# Patient Record
Sex: FEMALE
[De-identification: names, ages, dates, MRNs, and addresses within clinical notes are randomized per-mention and may not be internally consistent; named-entity substitution may affect disease eponyms.]

---

## 2020-10-21 ENCOUNTER — HOSPITAL ENCOUNTER (EMERGENCY)
Dept: HOSPITAL 97 - ER | Age: 25
LOS: 1 days | Discharge: HOME | End: 2020-10-22
Payer: SELF-PAY

## 2020-10-21 DIAGNOSIS — Z88.6: ICD-10-CM

## 2020-10-21 DIAGNOSIS — E03.9: ICD-10-CM

## 2020-10-21 DIAGNOSIS — S62.632A: Primary | ICD-10-CM

## 2020-10-21 PROCEDURE — 99284 EMERGENCY DEPT VISIT MOD MDM: CPT

## 2020-10-22 VITALS — SYSTOLIC BLOOD PRESSURE: 110 MMHG | OXYGEN SATURATION: 100 % | DIASTOLIC BLOOD PRESSURE: 84 MMHG | TEMPERATURE: 99 F

## 2020-10-22 PROCEDURE — 2W3JX1Z IMMOBILIZATION OF RIGHT FINGER USING SPLINT: ICD-10-PCS

## 2020-10-22 NOTE — EDPHYS
Physician Documentation                                                                           

 [facility]                                                                 

Name: Amira Sprague                                                                    

Age: 24 yrs                                                                                       

Sex: Female                                                                                       

: 1995                                                                                   

MRN: [id number]                                                                                   

Arrival Date: 10/21/2020                                                                          

Time: 22:41                                                                                       

Account#: [id number]                                                                            

Bed 15                                                                                            

Private MD:                                                                                       

ED Physician Shubham Alvarez                                                                         

HPI:                                                                                              

10/21                                                                                             

23:59 This 24 yrs old  Female presents to ER via Ambulatory with complaints of Finger jmm 

      Injury.                                                                                     

23:59 The patient or guardian reports injury, pain. Onset: The symptoms/episode               jmm 

      began/occurred acutely, just prior to arrival. Modifying factors: The symptoms are          

      alleviated by nothing, the symptoms are aggravated by nothing. Associated signs and         

      symptoms: Pertinent negatives: fever, numbness distally, tingling distally. This is a       

      24 year old female with a history of PE, hypothyroidism that presents to the ED with        

      complaints of right 3rd finger pain. Injury occurred while changing clothes. Denies         

      other injury. .                                                                             

                                                                                                  

OB/GYN:                                                                                           

23:15 LMP N/A -                                                                               bb  

                                                                                                  

Historical:                                                                                       

- Allergies:                                                                                      

23:15 Motrin;                                                                                 bb  

- Home Meds:                                                                                      

23:15 levothyroxine 50 mcg tab 1 tab once daily [Active]; clopidogrel 75 mg oral tab 1 tab    bb  

      once daily [Active];                                                                        

- PMHx:                                                                                           

23:15 PE; Hypothyroidism;                                                                     bb  

                                                                                                  

- Immunization history:: Adult Immunizations unknown.                                             

- Social history:: Smoking status: Patient denies any tobacco usage or history of.                

                                                                                                  

                                                                                                  

ROS:                                                                                              

23:59 Constitutional: Negative for fever, chills, and weight loss, Cardiovascular: Negative   jmm 

      for chest pain, palpitations, and edema, Respiratory: Negative for shortness of breath,     

      cough, wheezing, and pleuritic chest pain.                                                  

23:59 MS/extremity: Positive for injury or acute deformity.                                       

23:59 All other systems are negative.                                                             

                                                                                                  

Exam:                                                                                             

23:59 Constitutional:  This is a well developed, well nourished patient who is awake, alert,  jmm 

      and in no acute distress. Head/Face:  atraumatic. Eyes:  EOMI, no conjunctival erythema     

      appreciated ENT:  Moist Mucus Membranes Neck:  Trachea midline, Supple Chest/axilla:        

      Normal chest wall appearance and motion.   Cardiovascular:  Regular rate and rhythm.        

      No edema appreciated Respiratory:  Normal respirations, no respiratory distress             

      appreciated Abdomen/GI:  Non distended, soft Back:  Normal ROM Skin:  General               

      appearance color normal                                                                     

23:59 Musculoskeletal/extremity: right 3rd dip ttp, < 2 sec dist cap refill, NVI.                 

23:59 Skin: Appearance: Color: normal in color.                                                   

23:59 Neuro: Orientation: is normal, Mentation: is normal, Memory: is normal.                     

23:59 Psych: Behavior/mood is pleasant, cooperative.                                              

                                                                                                  

Vital Signs:                                                                                      

23:00  / 84; Pulse 80; Resp 16 S; Temp 99(TE); Pulse Ox 100% on R/A; Weight 65.77 kg    bb  

      (R); Height 5 ft. 4 in. (162.56 cm) (R); Pain 710;                                         

23:00 Body Mass Index 24.89 (65.77 kg, 162.56 cm)                                             bb  

                                                                                                  

MDM:                                                                                              

22:55 Patient medically screened.                                                             [facility] 

10/22                                                                                             

00:02 Data reviewed: vital signs, nurses notes. Counseling: I had a detailed discussion with  elyssa 

      the patient and/or guardian regarding: the historical points, exam findings, and any        

      diagnostic results supporting the discharge/admit diagnosis, radiology results, the         

      need for outpatient follow up, to return to the emergency department if symptoms worsen     

      or persist or if there are any questions or concerns that arise at home.                    

                                                                                                  

10/21                                                                                             

22:56 Order name: Hand Right 3 View XRAY                                                      [facility] 

10/21                                                                                             

22:56 Order name: Finger Splint; Complete Time: 00:18                                         [facility] 

                                                                                                  

Administered Medications:                                                                         

10/21                                                                                             

23:03 Drug: Norco 5 mg-325 mg 1 tabs Route: PO;                                               jd3 

10/22                                                                                             

00:18 Follow up: Response: No adverse reaction                                                ll2 

                                                                                                  

                                                                                                  

Disposition:                                                                                      

03:21 Co-signature as Attending Physician, Shubham Alvarez MD.                                    rn  

                                                                                                  

Disposition:                                                                                      

10/22/20 00:03 Discharged to Home. Impression: Finger Fracture.                                   

- Condition is Stable.                                                                            

- Discharge Instructions: Finger Fracture.                                                        

- Prescriptions for Ultracet 37.5- 325 mg Oral Tablet - take 1 tablet by ORAL route               

  every 6 hours - for up to 5 days; do not exceed 8 tablets per day.; 20 tablet.                  

- Medication Reconciliation Form, Thank You Letter, Antibiotic Education, Prescription            

  Opioid Use form.                                                                                

- Follow up: Private Physician; When: 2 - 3 days; Reason: Recheck today's complaints,             

  Continuance of care, Re-evaluation by your physician.                                           

                                                                                                  

                                                                                                  

                                                                                                  

Signatures:                                                                                       

Dispatcher [employer]                           EDWill Naranjo PA PA jmm Ballard, Kathy, RN                     RN   Shubham De Santiago MD MD rn Davies, Jonathon, RN                    RN   jd3                                                  

Chely Fields RN                    RN   ll2                                                  

                                                                                                  

Corrections: (The following items were deleted from the chart)                                    

00:18 00:03 10/22/2020 00:03 Discharged to Home. Impression: Finger Fracture. Condition is    ll2 

      Stable. Forms are Medication Reconciliation Form, Thank You Letter, Antibiotic              

      Education, Prescription Opioid Use. Follow up: Private Physician; When: 2 - 3 days;         

      Reason: Recheck today's complaints, Continuance of care, Re-evaluation by your              

      physician. elyssa                                                                              

                                                                                                  

**************************************************************************************************

## 2020-10-22 NOTE — ER
Nurse's Notes                                                                                     

 [facility]                                                                 

Name: Amira Sprague                                                                    

Age: 24 yrs                                                                                       

Sex: Female                                                                                       

: 1995                                                                                   

MRN: [id number]                                                                                   

Arrival Date: 10/21/2020                                                                          

Time: 22:41                                                                                       

Account#: [id number]                                                                            

Bed 15                                                                                            

Private MD:                                                                                       

Diagnosis: Finger Fracture                                                                        

                                                                                                  

Presentation:                                                                                     

10/21                                                                                             

23:00 Chief complaint: Patient states: she accidently caught her right middle finger in her   bb  

      shirt and injured it now it is swollen and painful. Coronavirus screen: At this time,       

      the client does not indicate any symptoms associated with coronavirus-19. Ebola Screen:     

      No symptoms or risks identified at this time. Initial Sepsis Screen: Does the patient       

      meet any 2 criteria? No. Patient's initial sepsis screen is negative. Does the patient      

      have a suspected source of infection? No. Patient's initial sepsis screen is negative.      

      Risk Assessment: Do you want to hurt yourself or someone else? Patient reports no           

      desire to harm self or others. Onset of symptoms was 2020.                      

23:00 Method Of Arrival: Ambulatory                                                           bb  

23:00 Acuity: NICK 4                                                                           bb  

                                                                                                  

Triage Assessment:                                                                                

10/22                                                                                             

00:16 General: Appears in no apparent distress. Pain:.                                        ll2 

00:17 Injury Description: pt fell while middle finger was stuck inside shirt.                 ll2 

                                                                                                  

OB/GYN:                                                                                           

10/21                                                                                             

23:15 LMP N/A -                                                                               bb  

                                                                                                  

Historical:                                                                                       

- Allergies:                                                                                      

23:15 Motrin;                                                                                 bb  

- Home Meds:                                                                                      

23:15 levothyroxine 50 mcg tab 1 tab once daily [Active]; clopidogrel 75 mg oral tab 1 tab    bb  

      once daily [Active];                                                                        

- PMHx:                                                                                           

23:15 PE; Hypothyroidism;                                                                     bb  

                                                                                                  

- Immunization history:: Adult Immunizations unknown.                                             

- Social history:: Smoking status: Patient denies any tobacco usage or history of.                

                                                                                                  

                                                                                                  

Screening:                                                                                        

10/22                                                                                             

00:16 Abuse screen: Denies threats or abuse. Nutritional screening: No deficits noted.        ll2 

      Tuberculosis screening: No symptoms or risk factors identified. Fall Risk None              

      identified.                                                                                 

                                                                                                  

Assessment:                                                                                       

10/21                                                                                             

23:15 General: Appears in no apparent distress. uncomfortable, Behavior is calm, cooperative, jd3 

      appropriate for age. Pain: Complains of pain in palmar aspect of distal phalanx of          

      right middle finger Quality of pain is described as sharp, tender. Neuro: Level of          

      Consciousness is awake, alert, obeys commands, Oriented to person, place, time,             

      situation. Cardiovascular: Denies Capillary refill < 3 seconds Patient's skin is warm       

      and dry. Respiratory: Airway is patent Respiratory effort is even, unlabored,               

      Respiratory pattern is regular, symmetrical, Denies cough, shortness of breath. GI: No      

      signs and/or symptoms were reported involving the gastrointestinal system. : No signs     

      and/or symptoms were reported regarding the genitourinary system. EENT: No signs and/or     

      symptoms were reported regarding the EENT system. Derm: Skin is intact, Skin is dry,        

      Skin is normal, Skin temperature is warm Bruising that is dark purple, on palmar aspect     

      of distal phalanx of right middle finger. Musculoskeletal: Circulation, motion, and         

      sensation intact. Range of motion: intact in all extremities.                               

23:55 Reassessment: Patient appears in no apparent distress at this time. Patient and/or      jd3 

      family updated on plan of care and expected duration. Pain level reassessed. Patient is     

      alert, oriented x 3, equal unlabored respirations, skin warm/dry/pink. awaiting X-ray       

      results.                                                                                    

                                                                                                  

Vital Signs:                                                                                      

23:00  / 84; Pulse 80; Resp 16 S; Temp 99(TE); Pulse Ox 100% on R/A; Weight 65.77 kg    bb  

      (R); Height 5 ft. 4 in. (162.56 cm) (R); Pain 7/10;                                         

23:00 Body Mass Index 24.89 (65.77 kg, 162.56 cm)                                             bb  

                                                                                                  

ED Course:                                                                                        

22:41 Patient arrived in ED.                                                                  am2 

22:49 Will Barros PA is PHCP.                                                              [facility] 

22:49 Shubham Alvarez MD is Attending Physician.                                                [facility] 

22:54 David Lan RN is Primary Nurse.                                                  jd3 

23:13 Triage completed.                                                                       bb  

23:15 Arm band placed on Patient placed in an exam room, on a stretcher, on pulse oximetry.   bb  

      Family accompanied patient.                                                                 

10/22                                                                                             

00:10 Hand Right 3 View XRAY In Process Unspecified.                                          EDMS

00:16 Patient has correct armband on for positive identification. Call light in reach. Side   ll2 

      rails up X 1. Adult w/ patient. Pulse ox on. NIBP on.                                       

00:16 No provider procedures requiring assistance completed. Patient did not have IV access   ll2 

      during this emergency room visit.                                                           

                                                                                                  

Administered Medications:                                                                         

10/21                                                                                             

23:03 Drug: Norco 5 mg-325 mg 1 tabs Route: PO;                                               jd3 

10/22                                                                                             

00:18 Follow up: Response: No adverse reaction                                                ll2 

                                                                                                  

                                                                                                  

Outcome:                                                                                          

00:03 Discharge ordered by MD. bergeron 

00:17 Discharged to home ambulatory.                                                          ll2 

00:17 Condition: stable                                                                           

00:17 Discharge instructions given to patient, family, Instructed on discharge instructions,      

      follow up and referral plans. medication usage, Demonstrated understanding of               

      instructions, follow-up care, medications, Prescriptions given X 1.                         

00:18 Patient left the ED.                                                                    ll2 

                                                                                                  

Signatures:                                                                                       

Dispatcher [employer]                           EDMS                                                 

Will Barros PA PA jmm Ballard, Brenda, RN                     RN   Felicia Swenson Jonathon, RN                    RN   jChely Camargo RN                    RN   ll2                                                  

                                                                                                  

**************************************************************************************************

## 2020-10-22 NOTE — RAD REPORT
EXAM DESCRIPTION:  RAD - Hand Right 3 View - 10/22/2020 12:03 am

 

CLINICAL HISTORY:  Right hand pain status post injury

 

FINDINGS:  Small transverse lucency is present in the base third distal phalanx seen on frontal view.
 This is equivocal for a small nondisplaced fracture.

 

No dislocation

## 2023-04-26 ENCOUNTER — HOSPITAL ENCOUNTER (EMERGENCY)
Dept: HOSPITAL 97 - ER | Age: 28
Discharge: HOME | End: 2023-04-26
Payer: SELF-PAY

## 2023-04-26 VITALS — SYSTOLIC BLOOD PRESSURE: 122 MMHG | DIASTOLIC BLOOD PRESSURE: 84 MMHG | OXYGEN SATURATION: 99 %

## 2023-04-26 VITALS — TEMPERATURE: 98.5 F

## 2023-04-26 DIAGNOSIS — E03.9: ICD-10-CM

## 2023-04-26 DIAGNOSIS — Z88.5: ICD-10-CM

## 2023-04-26 DIAGNOSIS — K13.70: Primary | ICD-10-CM

## 2023-04-26 PROCEDURE — 99283 EMERGENCY DEPT VISIT LOW MDM: CPT

## 2023-04-26 NOTE — ER
Nurse's Notes                                                                                     

 [facility]                                                                 

Name: Amira Sprague                                                                    

Age: 27 yrs                                                                                       

Sex: Female                                                                                       

: 1995                                                                                   

MRN: [id number]                                                                                   

Arrival Date: 2023                                                                          

Time: 00:24                                                                                       

Account#: [id number]                                                                            

Bed IW1                                                                                           

Private MD:                                                                                       

Diagnosis: Unspecified lesions of oral mucosa                                                     

                                                                                                  

Presentation:                                                                                     

                                                                                             

00:59 Chief complaint: Patient states: My mouth is sore. I cant see it very well in the       kd3 

      mirror. It feels sore on the inside on my lower lip. It started around 3 or 4 today. I      

      did not bite myself or anything. Coronavirus screen: Vaccine status: Patient reports        

      being unvaccinated. Ebola Screen: No symptoms or risks identified at this time.             

00:59 Method Of Arrival: Ambulatory                                                           kd3 

01:05 Initial Sepsis Screen: Does the patient meet any 2 criteria? No. Patient's initial      kd3 

      sepsis screen is negative. Does the patient have a suspected source of infection? No.       

      Patient's initial sepsis screen is negative. Risk Assessment: Do you want to hurt           

      yourself or someone else? Patient reports no desire to harm self or others. Onset of        

      symptoms was 2023.                                                                

01:05 Acuity: NICK 4                                                                           kd3 

                                                                                                  

Triage Assessment:                                                                                

01:04 General: Appears in no apparent distress. Behavior is calm, cooperative. Pain:          kd3 

      Complains of pain in mouth.                                                                 

                                                                                                  

OB/GYN:                                                                                           

01:04 LMP 2023                                                                            kd3 

                                                                                                  

Historical:                                                                                       

- Allergies:                                                                                      

01:04 Motrin;                                                                                 kd3 

01:04 Fentanyl;                                                                               kd3 

- Home Meds:                                                                                      

01:04 levothyroxine 50 mcg tab 1 tab once daily [Active]; clopidogrel 75 mg Oral tab 1 tab    kd3 

      once daily [Active];                                                                        

- PMHx:                                                                                           

01:04 Hypothyroidism; PE;                                                                     kd3 

                                                                                                  

- Immunization history:: Adult Immunizations up to date.                                          

- Social history:: Smoking status: Patient denies any tobacco usage or history of.                

                                                                                                  

                                                                                                  

Screenin: [facility] ED Fall Risk Assessment (Adult) History of falling in the last 3 months,       kd3 

      including since admission No falls in past 3 months (0 pts) Confusion or Disorientation     

      No (0 pts) Intoxicated or Sedated No (0 pts) Impaired Gait No (0 pts) Mobility Assist       

      Device Used No (0 pt) Altered Elimination No (0 pt) Score/Fall Risk Level 0 - 2 = Low       

      Risk Maintained a safe environment. Abuse screen: Denies threats or abuse. Denies           

      injuries from another. Nutritional screening: No deficits noted. Tuberculosis               

      screening: No symptoms or risk factors identified.                                          

                                                                                                  

Vital Signs:                                                                                      

00:59 Weight 69.85 kg; Height 5 ft. 4 in. ;                                                   kd3 

01:05  / 84; Pulse 79; Resp 16; Pulse Ox 99% on R/A;                                    kd3 

01:06 Temp 98.5(O);                                                                           kd3 

00:59 Body Mass Index 26.43 (69.85 kg, 162.56 cm)                                             kd3 

                                                                                                  

ED Course:                                                                                        

00:32 Patient arrived in ED.                                                                  ag3 

00:44 Afshin Rod PA is PHCP.                                                                cp  

00:44 Rakesh Carreon MD is Attending Physician.                                              cp  

01:05 Triage completed.                                                                       kd3 

01:06 Arm band placed on left wrist.                                                          kd3 

01:12 Silva Neri RN is Primary Nurse.                                                    kd3 

01:23 Patient has correct armband on for positive identification.                             kd3 

01:23 No provider procedures requiring assistance completed. Patient did not have IV access   kd3 

      during this emergency room visit.                                                           

                                                                                                  

Administered Medications:                                                                         

01:12 Drug: Viscous Lidocaine Mucous Membrane Liquid (4 %) 5 ml Route: Mucous Membrane;       kd3 

                                                                                                  

                                                                                                  

Medication:                                                                                       

01:23 VIS not applicable for this client.                                                     kd3 

                                                                                                  

Outcome:                                                                                          

01:15 Discharge ordered by MD.                                                                cp  

01:23 Discharged to home ambulatory.                                                          kd3 

01:23 Condition: stable                                                                           

01:23 Discharge instructions given to patient, Instructed on discharge instructions, follow       

      up and referral plans. Demonstrated understanding of instructions, follow-up care,          

      medications, Prescriptions given X 1.                                                       

01:23 Patient left the ED.                                                                    kd3 

                                                                                                  

Signatures:                                                                                       

Afshin Rod PA                         PA   Kelin Stevenson                                 ag3                                                  

Silva Neri, RN                      RN   kd3                                                  

                                                                                                  

**************************************************************************************************

## 2023-04-26 NOTE — EDPHYS
Physician Documentation                                                                           

 [facility]                                                                 

Name: Amira Sprague                                                                    

Age: 27 yrs                                                                                       

Sex: Female                                                                                       

: 1995                                                                                   

MRN: [id number]                                                                                   

Arrival Date: 2023                                                                          

Time: 00:24                                                                                       

Account#: [id number]                                                                            

Bed IW1                                                                                           

Private MD:                                                                                       

ED Physician Rakesh Carreon                                                                       

HPI:                                                                                              

                                                                                             

01:10 This 27 yrs old  Female presents to ER via Ambulatory with complaints of Mouth  cp  

      Swelling.                                                                                   

01:10 The patient presents with a lesion, redness, swelling. The problem is located in the    cp  

      inner lower lip. Onset: The symptoms/episode began/occurred today. Associated signs and     

      symptoms: Pertinent positives: pain, Pertinent negatives: chills, dysphagia, fever,         

      inability to eat, vomiting. Severity of symptoms: in the emergency department the           

      symptoms are unchanged, despite home interventions.                                         

                                                                                                  

OB/GYN:                                                                                           

01:04 LMP 2023                                                                            kd3 

                                                                                                  

Historical:                                                                                       

- Allergies:                                                                                      

01:04 Motrin;                                                                                 kd3 

01:04 Fentanyl;                                                                               kd3 

- Home Meds:                                                                                      

01:04 levothyroxine 50 mcg tab 1 tab once daily [Active]; clopidogrel 75 mg Oral tab 1 tab    kd3 

      once daily [Active];                                                                        

- PMHx:                                                                                           

01:04 Hypothyroidism; PE;                                                                     kd3 

                                                                                                  

- Immunization history:: Adult Immunizations up to date.                                          

- Social history:: Smoking status: Patient denies any tobacco usage or history of.                

                                                                                                  

                                                                                                  

ROS:                                                                                              

01:12 Constitutional: Negative for body aches, chills, fever, poor PO intake.                 cp  

01:12 Eyes: Negative for injury, pain, redness, and discharge.                                cp  

01:12 ENT: Negative for drainage from ear(s), ear pain, sore throat, difficulty swallowing,       

      difficulty handling secretions.                                                             

01:12 Respiratory: Negative for cough, shortness of breath, wheezing.                             

01:12 Abdomen/GI: Negative for abdominal pain, nausea, vomiting, and diarrhea.                    

01:12 Neuro: Negative for headache.                                                               

01:12 All other systems are negative.                                                             

                                                                                                  

Exam:                                                                                             

01:13 Constitutional: The patient appears in no acute distress, alert, awake, comfortable,    cp  

      non-toxic, well developed, well nourished.                                                  

01:13 Head/Face:  Normocephalic, atraumatic.                                                  cp  

01:13 ENT: External ear(s): are unremarkable, Ear canal(s): are normal, clear, TM's:              

      dullness, bilaterally, Mouth: Lips: moist, Oral mucosa: noted erythematous ulcer type       

      lesion inner lower left side lip with mild swelling, pain to palpation, Posterior           

      pharynx: is normal, airway is patent, no erythema, no exudate, Tonsils: are normal in       

      appearance.                                                                                 

01:13 Neck: Lymph nodes: no appreciated lymphadenopathy.                                          

01:13 Cardiovascular: Rate: normal.                                                               

01:13 Respiratory: the patient does not display signs of respiratory distress,  Respirations:     

      normal.                                                                                     

01:13 Skin: cellulitis, is not appreciated.                                                       

                                                                                                  

Vital Signs:                                                                                      

00:59 Weight 69.85 kg; Height 5 ft. 4 in. ;                                                   kd3 

01:05  / 84; Pulse 79; Resp 16; Pulse Ox 99% on R/A;                                    kd3 

01:06 Temp 98.5(O);                                                                           kd3 

00:59 Body Mass Index 26.43 (69.85 kg, 162.56 cm)                                             kd3 

                                                                                                  

MDM:                                                                                              

01:07 Patient medically screened.                                                             cp  

01:15 Data reviewed: vital signs, nurses notes, and as a result, I will discharge patient.    cp  

                                                                                                  

Administered Medications:                                                                         

01:12 Drug: Viscous Lidocaine Mucous Membrane Liquid (4 %) 5 ml Route: Mucous Membrane;       kd3 

                                                                                                  

                                                                                                  

Disposition Summary:                                                                              

23 01:15                                                                                    

Discharge Ordered                                                                                 

      Location: Home                                                                          cp  

      Problem: new                                                                            cp  

      Symptoms: have improved                                                                 cp  

      Condition: Stable                                                                       cp  

      Diagnosis                                                                                   

        - Unspecified lesions of oral mucosa                                                  cp  

      Followup:                                                                               cp  

        - With: Private Physician                                                                  

        - When: 2 - 3 days                                                                         

        - Reason: Worsening of condition                                                           

      Discharge Instructions:                                                                     

        - Discharge Summary Sheet                                                             cp  

        - Oral Ulcers                                                                         cp  

      Forms:                                                                                      

        - Medication Reconciliation Form                                                      cp  

        - Thank You Letter                                                                    cp  

        - Antibiotic Education                                                                cp  

        - Prescription Opioid Use                                                             cp  

      Prescriptions:                                                                              

        - fluocinonide 0.05 % Topical gel                                                          

            - apply 1 application by TOPICAL route 2-3 times daily for 7 days apply to oral   cp  

      lesion as directed; 15 gram; Refills: 0, Product Selection Permitted                        

Signatures:                                                                                       

Afshin Rod PA PA cp Doucette, Kyli, RN                      RN   kd3                                                  

                                                                                                  

**************************************************************************************************

## 2023-07-16 ENCOUNTER — HOSPITAL ENCOUNTER (EMERGENCY)
Dept: HOSPITAL 97 - ER | Age: 28
Discharge: HOME | End: 2023-07-16
Payer: SELF-PAY

## 2023-07-16 VITALS — TEMPERATURE: 98.2 F | OXYGEN SATURATION: 99 %

## 2023-07-16 VITALS — DIASTOLIC BLOOD PRESSURE: 88 MMHG | SYSTOLIC BLOOD PRESSURE: 122 MMHG

## 2023-07-16 DIAGNOSIS — R10.31: Primary | ICD-10-CM

## 2023-07-16 LAB
ALBUMIN SERPL BCP-MCNC: 4.1 G/DL (ref 3.4–5)
ALP SERPL-CCNC: 80 U/L (ref 45–117)
ALT SERPL W P-5'-P-CCNC: 19 U/L (ref 13–56)
AST SERPL W P-5'-P-CCNC: 9 U/L (ref 15–37)
BUN BLD-MCNC: 15 MG/DL (ref 7–18)
GLUCOSE SERPLBLD-MCNC: 116 MG/DL (ref 74–106)
HCT VFR BLD CALC: 40.8 % (ref 36–45)
LIPASE SERPL-CCNC: 27 U/L (ref 13–75)
LYMPHOCYTES # SPEC AUTO: 1.3 K/UL (ref 0.7–4.9)
MCV RBC: 93 FL (ref 80–100)
PMV BLD: 8.6 FL (ref 7.6–11.3)
POTASSIUM SERPL-SCNC: 4 MEQ/L (ref 3.5–5.1)
RBC # BLD: 4.38 M/UL (ref 3.86–4.86)

## 2023-07-16 PROCEDURE — 80053 COMPREHEN METABOLIC PANEL: CPT

## 2023-07-16 PROCEDURE — 36415 COLL VENOUS BLD VENIPUNCTURE: CPT

## 2023-07-16 PROCEDURE — 74177 CT ABD & PELVIS W/CONTRAST: CPT

## 2023-07-16 PROCEDURE — 83690 ASSAY OF LIPASE: CPT

## 2023-07-16 PROCEDURE — 81025 URINE PREGNANCY TEST: CPT

## 2023-07-16 PROCEDURE — 99284 EMERGENCY DEPT VISIT MOD MDM: CPT

## 2023-07-16 PROCEDURE — 81001 URINALYSIS AUTO W/SCOPE: CPT

## 2023-07-16 PROCEDURE — 85025 COMPLETE CBC W/AUTO DIFF WBC: CPT

## 2023-07-16 NOTE — RAD REPORT
EXAM DESCRIPTION:  CT - Abdomen   Pelvis W Contrast - 7/16/2023 7:08 am

 

 

CLINICAL HISTORY:  The patient is 27 years old and is Female; LOWER ABD PAIN

 

TECHNIQUE:  Axial computed tomography images of the abdomen and pelvis with intravenous contrast.   S
agittal and coronal reformatted images were created and reviewed.   This CT exam was performed using 
one or more of the following dose reduction techniques:   automated exposure control, adjustment of t
he mA and/or kV according to patient size, and/or use of iterative reconstruction technique.

 

COMPARISON:  January 15, 2023.

 

FINDINGS:  Lung bases:   Unremarkable.   No mass.   No consolidation.

ABDOMEN:

  Liver:   Unremarkable.   No mass.

  Gallbladder and bile ducts:   Unremarkable.   No calcified stones.   No ductal dilation.

  Pancreas:   No findings to suggest acute pancreatitis. No mass visualized.   No ductal dilation.

  Spleen:   Unremarkable.   No splenomegaly.

  Adrenals:   Unremarkable.   No mass.

  Kidneys and ureters:   Unremarkable.   No solid mass.   No hydronephrosis.

  Stomach and bowel:   No bowel dilatation or obstruction. No bowel wall thickening.

        Stomach is unremarkable.

PELVIS:

  Appendix:   The visualized appendix is normal. No pericecal inflammation to suggest acute appendici
tis.

  Bladder:   Bladder is not well distended. No stones.

  Reproductive:   Retroverted uterus. No adnexal mass visualized.

ABDOMEN and PELVIS:

  Intraperitoneal space:   Unremarkable.   No free air.   No significant fluid collection.

  Bones/joints:   No acute fracture.   No dislocation.

  Soft tissues:   Unremarkable.

  Vasculature:   Unremarkable.   No abdominal aortic aneurysm.

  Lymph nodes:   No pathologically enlarged lymph nodes.

 

IMPRESSION:  No acute obstructive or inflammatory process identified. Normal appendix.

 

Electronically signed by:   Karla Krishnamurthy MD   7/16/2023 5:55 AM CDT Workstation: [id number]

 

 

Due to temporary technical issues with the PACS/Fluency reporting system, reports are being signed by
 the in house radiologists without review as a courtesy to insure prompt reporting. The interpreting 
radiologist is fully responsible for the content of the report.

## 2023-07-16 NOTE — ER
Nurse's Notes                                                                                     

 [facility]                                                                 

Name: Amira Sprague                                                                    

Age: 27 yrs                                                                                       

Sex: Female                                                                                       

: 1995                                                                                   

MRN: [id number]                                                                                   

Arrival Date: 2023                                                                          

Time: 03:33                                                                                       

Account#: [id number]                                                                            

Bed 5                                                                                             

Private MD:                                                                                       

Diagnosis: Abdominal pain, unspecified                                                            

                                                                                                  

Presentation:                                                                                     

                                                                                             

03:54 Chief complaint: Patient states: I have been having some lower abdominal cramping.      kd3 

      [place] Screen: No symptoms or risks identified at this time. Initial Sepsis Screen: Does     

      the patient meet any 2 criteria? No. Patient's initial sepsis screen is negative. Does      

      the patient have a suspected source of infection? No. Patient's initial sepsis screen       

      is negative. Risk Assessment: Do you want to hurt yourself or someone else? Patient         

      reports no desire to harm self or others. Onset of symptoms was 2023.              

03:54 Method Of Arrival: Ambulatory                                                           kd3 

03:54 Acuity: NICK 3                                                                           kd3 

04:11 Chief complaint: Patient states: I have had some vaginal bleeding and i have had a cyst kd3 

      before. Coronavirus screen: Vaccine status: unknown.                                        

                                                                                                  

Triage Assessment:                                                                                

03:57 General: Appears uncomfortable, Behavior is calm, cooperative. Pain: Complains of pain  kd3 

      in abdomen. GI: Abdomen is non-distended.                                                   

                                                                                                  

OB/GYN:                                                                                           

04:11 LMP 2023                                                                           kd3 

                                                                                                  

Historical:                                                                                       

- Allergies:                                                                                      

03:57 Fentanyl;                                                                               kd3 

03:57 Motrin;                                                                                 kd3 

- PMHx:                                                                                           

03:57 Hypothyroidism; PE;                                                                     kd3 

                                                                                                  

- Immunization history:: Adult Immunizations up to date.                                          

- Social history:: Smoking status: unknown.                                                       

                                                                                                  

                                                                                                  

Screenin:27 [facility] ED Fall Risk Assessment (Adult) Score/Fall Risk Level 0 - 2 = Low Risk. Abuse  as6 

      screen: Denies threats or abuse. Denies injuries from another. Nutritional screening:       

      No deficits noted. Tuberculosis screening: No symptoms or risk factors identified.          

                                                                                                  

Assessment:                                                                                       

04:30 General: Appears in no apparent distress. Behavior is calm, cooperative. Pain:          as6 

      Complains of pain in right lower quadrant and left lower quadrant Quality of pain is        

      described as crampy. Neuro: Level of Consciousness is awake, alert, obeys commands,         

      Oriented to person, place, time, situation. Cardiovascular: Capillary refill < 3            

      seconds Patient's skin is warm and dry. Respiratory: Respiratory effort is even,            

      unlabored, Respiratory pattern is regular, symmetrical. GI: Abd is soft Reports lower       

      abdominal pain, cramping.                                                                   

                                                                                                  

Vital Signs:                                                                                      

03:54  / 90; Pulse 81; Resp 19; Temp 98.2(TE); Pulse Ox 99% on R/A; Weight 51.26 kg;    kd3 

      Height 5 ft. 4 in. ;                                                                        

05:51  / 88; Pulse 85; Resp 18 S; Pulse Ox 99% on R/A;                                  as6 

03:54 Body Mass Index 19.40 (51.26 kg, 162.56 cm)                                             kd3 

                                                                                                  

ED Course:                                                                                        

03:36 Patient arrived in ED.                                                                  ja2 

03:42 Leroy Pink MD is Attending Physician.                                            rt  

03:57 Triage completed.                                                                       kd3 

03:57 Arm band placed on right wrist.                                                         kd3 

04:19 Malcolm Scott, RN is Primary Nurse.                                                    as6 

04:26 Inserted saline lock: 20 gauge in right antecubital area, using aseptic technique.      as6 

      Blood collected.                                                                            

04:27 Bed in low position. Call light in reach. Side rails up X 1.                            as6 

05:34 CT Abd/Pelvis - IV Contrast Only In Process Unspecified.                                EDMS

06:10 Provided Education on: discharge teaching.                                              as6 

06:11 No provider procedures requiring assistance completed. IV discontinued, intact,         as6 

      bleeding controlled, No redness/swelling at site. Pressure dressing applied.                

                                                                                                  

Administered Medications:                                                                         

No medications were administered                                                                  

                                                                                                  

                                                                                                  

Medication:                                                                                       

04:31 VIS not applicable for this client.                                                     as6 

                                                                                                  

Outcome:                                                                                          

06:07 Discharge ordered by MD.                                                                rt  

06:11 Discharged to home ambulatory.                                                          as6 

06:11 Condition: stable                                                                           

06:11 Discharge instructions given to patient, Instructed on discharge instructions, follow       

      up and referral plans. Demonstrated understanding of instructions, follow-up care.          

06:11 Patient left the ED.                                                                    as6 

                                                                                                  

Signatures:                                                                                       

Dispatcher [employer]                           EDMS                                                 

Jewel Loraine                           ja2                                                  

Malcolm Scott, DOLORES                      RN   as6                                                  

Silva Neri RN                      RN   kd3                                                  

Leroy Pink MD MD   rt                                                   

                                                                                                  

**************************************************************************************************

## 2023-07-16 NOTE — EDPHYS
Physician Documentation                                                                           

 [facility]                                                                 

Name: Amira Sprague                                                                    

Age: 27 yrs                                                                                       

Sex: Female                                                                                       

: 1995                                                                                   

MRN: [id number]                                                                                   

Arrival Date: 2023                                                                          

Time: 03:33                                                                                       

Account#: [id number]                                                                            

Bed 5                                                                                             

Private MD:                                                                                       

ED Physician Leroy Pink                                                                     

HPI:                                                                                              

                                                                                             

05:20 This 27 yrs old  Female presents to ER via Ambulatory with complaints of        rt  

      Abdominal Pain.                                                                             

05:20 History obtained via . Patient presents to the ED with a lower rt  

      abdominal pain as well as vaginal bleeding starting last night. The pain radiates to        

      the right. She denies nausea, vomiting, other acute complaints at this time. Symptoms       

      are aching nature, nonradiating, moderate severity, no other aggravating or elevating       

      factors.                                                                                    

                                                                                                  

OB/GYN:                                                                                           

04:11 LMP 2023                                                                           kd3 

                                                                                                  

Historical:                                                                                       

- Allergies:                                                                                      

03:57 Fentanyl;                                                                               kd3 

03:57 Motrin;                                                                                 kd3 

- PMHx:                                                                                           

03:57 Hypothyroidism; PE;                                                                     kd3 

                                                                                                  

- Immunization history:: Adult Immunizations up to date.                                          

- Social history:: Smoking status: unknown.                                                       

                                                                                                  

                                                                                                  

ROS:                                                                                              

05:21 Constitutional: Negative for fever, chills, and weight loss, Cardiovascular: Negative   rt  

      for chest pain, palpitations, and edema, Respiratory: Negative for shortness of breath,     

      cough, wheezing, and pleuritic chest pain, Skin: Negative for injury, rash, and             

      discoloration, Neuro: Negative for headache, weakness, numbness, tingling, and seizure,     

      Psych: Negative for depression, anxiety, suicide ideation, homicidal ideation, and          

      hallucinations.                                                                             

05:21 Abdomen/GI: Positive for abdominal pain, Negative for nausea, vomiting, and diarrhea.       

05:21 : Positive for vaginal bleeding, Negative for burning with urination.                     

                                                                                                  

Exam:                                                                                             

05:21 Constitutional:  This is a well developed, well nourished patient who is awake, alert,  rt  

      and in no acute distress. Head/Face:  Normocephalic, atraumatic. Chest/axilla:  Normal      

      chest wall appearance and motion.  Nontender with no deformity.  No lesions are             

      appreciated. Cardiovascular:  Regular rate and rhythm with a normal S1 and S2.  No          

      gallops, murmurs, or rubs.  Normal PMI, no JVD.  No pulse deficits. Respiratory:  Lungs     

      have equal breath sounds bilaterally, clear to auscultation and percussion.  No rales,      

      rhonchi or wheezes noted.  No increased work of breathing, no retractions or nasal          

      flaring. Skin:  Warm, dry with normal turgor.  Normal color with no rashes, no lesions,     

      and no evidence of cellulitis. MS/ Extremity:  Pulses equal, no cyanosis.                   

      Neurovascular intact.  Full, normal range of motion.                                        

05:21 Abdomen/GI: Tenderness to the suprapubic to right lower quadrant, no rebound, guarding,     

      distention.                                                                                 

                                                                                                  

Vital Signs:                                                                                      

03:54  / 90; Pulse 81; Resp 19; Temp 98.2(TE); Pulse Ox 99% on R/A; Weight 51.26 kg;    kd3 

      Height 5 ft. 4 in. ;                                                                        

05:51  / 88; Pulse 85; Resp 18 S; Pulse Ox 99% on R/A;                                  as6 

03:54 Body Mass Index 19.40 (51.26 kg, 162.56 cm)                                             kd3 

                                                                                                  

MDM:                                                                                              

03:47 Patient medically screened.                                                             rt  

06:07 Differential diagnosis: UTI, pyelonephritis, ureteral stone, appendicitis, ovarian      rt  

      cyst, ovarian torsion, unspecified abdominal pain. Data reviewed: vital signs, nurses       

      notes, lab test result(s), radiologic studies. Independent interpretation of the            

      following test(s) in the Emergency Department CT Scan: My interpretation is No bowel        

      obstruction syndrome interpretation of the CT scan images. Test considered but Not          

      performed: Ultrasound Symptoms are not consistent with an ovarian torsion, ultrasound       

      not indicated. Counseling: I had a detailed discussion with the patient and/or guardian     

      regarding: the historical points, exam findings, and any diagnostic results supporting      

      the discharge/admit diagnosis, lab results, radiology results, the need for outpatient      

      follow up. ED course: Patient symptoms resolved without any treatment. Discussed the        

      results of negative work-up with the patient. She is to follow-up as an outpatient. She     

      understands that she may return anytime for worsening symptoms or new concerning            

      symptoms..                                                                                  

                                                                                                  

                                                                                             

04:09 Order name: CBC with Diff; Complete Time: 04:54                                         rt  

                                                                                             

04:09 Order name: CMP; Complete Time: 04:54                                                   rt  

                                                                                             

04:09 Order name: Lipase; Complete Time: 04:54                                                rt  

                                                                                             

04:09 Order name: Pregnancy Test, Urine; Complete Time: 04:54                                 rt  

                                                                                             

04:09 Order name: Urinalysis w/ reflexes; Complete Time: 04:54                                rt  

                                                                                             

04:58 Order name: CT Abd/Pelvis - IV Contrast Only                                            rt  

                                                                                             

04:09 Order name: IV Saline Lock; Complete Time: 04:26                                        rt  

                                                                                             

04:09 Order name: Labs collected and sent; Complete Time: 04:26                               rt  

                                                                                                  

Administered Medications:                                                                         

No medications were administered                                                                  

                                                                                                  

                                                                                                  

Disposition Summary:                                                                              

23 06:07                                                                                    

Discharge Ordered                                                                                 

      Location: Home                                                                          rt  

      Problem: new                                                                            rt  

      Symptoms: are resolved                                                                  rt  

      Condition: Stable                                                                       rt  

      Diagnosis                                                                                   

        - Abdominal pain, unspecified                                                         rt  

      Followup:                                                                               rt  

        - With: Private Physician                                                                  

        - When: 2 - 3 days                                                                         

        - Reason:                                                                                  

      Discharge Instructions:                                                                     

        - Discharge Summary Sheet                                                             rt  

        - Abdominal Pain, Adult                                                               rt  

      Forms:                                                                                      

        - Medication Reconciliation Form                                                      rt  

        - Thank You Letter                                                                    rt  

        - Antibiotic Education                                                                rt  

        - Prescription Opioid Use                                                             rt  

        - Patient Portal Instructions                                                         rt  

Signatures:                                                                                       

Dispatcher [employer]                           Silva Frank RN                      RN   kd3                                                  

Leroy Pink MD MD   rt                                                   

                                                                                                  

**************************************************************************************************

## 2023-08-07 ENCOUNTER — HOSPITAL ENCOUNTER (EMERGENCY)
Dept: HOSPITAL 97 - ER | Age: 28
Discharge: HOME | End: 2023-08-07
Payer: SELF-PAY

## 2023-08-07 VITALS — OXYGEN SATURATION: 99 % | TEMPERATURE: 98.4 F

## 2023-08-07 VITALS — DIASTOLIC BLOOD PRESSURE: 56 MMHG | SYSTOLIC BLOOD PRESSURE: 110 MMHG

## 2023-08-07 DIAGNOSIS — S40.012A: ICD-10-CM

## 2023-08-07 DIAGNOSIS — S53.492A: Primary | ICD-10-CM

## 2023-08-07 PROCEDURE — 72040 X-RAY EXAM NECK SPINE 2-3 VW: CPT

## 2023-08-07 PROCEDURE — 73010 X-RAY EXAM OF SHOULDER BLADE: CPT

## 2023-08-07 PROCEDURE — 99284 EMERGENCY DEPT VISIT MOD MDM: CPT

## 2023-08-20 ENCOUNTER — HOSPITAL ENCOUNTER (EMERGENCY)
Dept: HOSPITAL 97 - ER | Age: 28
Discharge: HOME | End: 2023-08-20
Payer: SELF-PAY

## 2023-08-20 VITALS — SYSTOLIC BLOOD PRESSURE: 122 MMHG | DIASTOLIC BLOOD PRESSURE: 83 MMHG | TEMPERATURE: 98.7 F

## 2023-08-20 VITALS — OXYGEN SATURATION: 97 %

## 2023-08-20 DIAGNOSIS — Z11.3: Primary | ICD-10-CM

## 2023-08-20 DIAGNOSIS — N77.1: ICD-10-CM

## 2023-08-20 PROCEDURE — 99284 EMERGENCY DEPT VISIT MOD MDM: CPT

## 2023-08-20 PROCEDURE — 87490 CHLMYD TRACH DNA DIR PROBE: CPT

## 2023-08-20 PROCEDURE — 87210 SMEAR WET MOUNT SALINE/INK: CPT

## 2023-08-20 PROCEDURE — 81015 MICROSCOPIC EXAM OF URINE: CPT

## 2023-08-20 PROCEDURE — 87590 N.GONORRHOEAE DNA DIR PROB: CPT

## 2023-08-20 PROCEDURE — 87086 URINE CULTURE/COLONY COUNT: CPT

## 2023-08-20 PROCEDURE — 96372 THER/PROPH/DIAG INJ SC/IM: CPT

## 2023-08-20 PROCEDURE — 87088 URINE BACTERIA CULTURE: CPT

## 2023-08-20 PROCEDURE — 81025 URINE PREGNANCY TEST: CPT

## 2023-11-27 ENCOUNTER — HOSPITAL ENCOUNTER (EMERGENCY)
Dept: HOSPITAL 97 - ER | Age: 28
Discharge: HOME | End: 2023-11-27
Payer: SELF-PAY

## 2023-11-27 VITALS — TEMPERATURE: 98.2 F | OXYGEN SATURATION: 100 %

## 2023-11-27 VITALS — SYSTOLIC BLOOD PRESSURE: 118 MMHG | DIASTOLIC BLOOD PRESSURE: 82 MMHG

## 2023-11-27 DIAGNOSIS — J02.9: Primary | ICD-10-CM

## 2023-11-27 DIAGNOSIS — K11.6: ICD-10-CM

## 2023-11-27 DIAGNOSIS — Z11.52: ICD-10-CM

## 2023-11-27 DIAGNOSIS — R53.81: ICD-10-CM

## 2023-11-27 LAB — SARS-COV-2 RNA RESP QL NAA+PROBE: NEGATIVE

## 2023-11-27 PROCEDURE — 99283 EMERGENCY DEPT VISIT LOW MDM: CPT

## 2023-11-27 PROCEDURE — 0241U: CPT

## 2024-03-21 ENCOUNTER — HOSPITAL ENCOUNTER (EMERGENCY)
Dept: HOSPITAL 97 - ER | Age: 29
End: 2024-03-21
Payer: SELF-PAY

## 2024-03-21 DIAGNOSIS — L29.9: ICD-10-CM

## 2024-03-21 DIAGNOSIS — E03.9: Primary | ICD-10-CM

## 2024-03-21 LAB
ALBUMIN SERPL BCP-MCNC: 3.7 G/DL (ref 3.4–5)
ALBUMIN/GLOB SERPL: 0.9 {RATIO} (ref 1.1–1.8)
ALP SERPL-CCNC: 88 U/L (ref 45–117)
ALT SERPL W P-5'-P-CCNC: 17 U/L (ref 13–56)
ANION GAP SERPL CALC-SCNC: 8.6 MEQ/L (ref 5–15)
AST SERPL W P-5'-P-CCNC: 12 U/L (ref 15–37)
BUN BLD-MCNC: 20 MG/DL (ref 7–18)
GLOBULIN SER CALC-MCNC: 4 G/DL (ref 2.3–3.5)
GLUCOSE SERPLBLD-MCNC: 90 MG/DL (ref 74–106)
HCT VFR BLD CALC: 40 % (ref 36–45)
HGB BLD-MCNC: 14.1 G/DL (ref 12–15)
LYMPHOCYTES # SPEC AUTO: 1.8 K/UL (ref 0.7–4.9)
MCH RBC QN AUTO: 32.5 PG (ref 27–35)
MCHC RBC AUTO-ENTMCNC: 35.4 G/DL (ref 32–36)
MCV RBC: 92 FL (ref 80–100)
NRBC # BLD: 0 10*3/UL (ref 0–0)
NRBC BLD AUTO-RTO: 0.1 % (ref 0–0)
PMV BLD: 8.9 FL (ref 7.6–11.3)
POTASSIUM SERPL-SCNC: 3.6 MEQ/L (ref 3.5–5.1)
RBC # BLD: 4.35 M/UL (ref 3.86–4.86)
TSH SERPL DL<=0.05 MIU/L-ACNC: 1.27 UIU/ML (ref 0.36–3.74)
WBC # BLD AUTO: 7.9 THOU/UL (ref 4.3–10.9)

## 2024-03-21 PROCEDURE — 84703 CHORIONIC GONADOTROPIN ASSAY: CPT

## 2024-03-21 PROCEDURE — 96375 TX/PRO/DX INJ NEW DRUG ADDON: CPT

## 2024-03-21 PROCEDURE — 85025 COMPLETE CBC W/AUTO DIFF WBC: CPT

## 2024-03-21 PROCEDURE — 36415 COLL VENOUS BLD VENIPUNCTURE: CPT

## 2024-03-21 PROCEDURE — 99284 EMERGENCY DEPT VISIT MOD MDM: CPT

## 2024-03-21 PROCEDURE — 80053 COMPREHEN METABOLIC PANEL: CPT

## 2024-03-21 PROCEDURE — 84443 ASSAY THYROID STIM HORMONE: CPT

## 2024-03-21 PROCEDURE — 96374 THER/PROPH/DIAG INJ IV PUSH: CPT

## 2024-03-21 PROCEDURE — 84439 ASSAY OF FREE THYROXINE: CPT

## 2024-03-22 VITALS — DIASTOLIC BLOOD PRESSURE: 81 MMHG | OXYGEN SATURATION: 98 % | SYSTOLIC BLOOD PRESSURE: 129 MMHG | TEMPERATURE: 98.4 F
